# Patient Record
Sex: FEMALE
[De-identification: names, ages, dates, MRNs, and addresses within clinical notes are randomized per-mention and may not be internally consistent; named-entity substitution may affect disease eponyms.]

---

## 2018-01-27 ENCOUNTER — HOSPITAL ENCOUNTER (EMERGENCY)
Dept: HOSPITAL 88 - ER | Age: 63
Discharge: HOME | End: 2018-01-27
Payer: COMMERCIAL
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
## 2018-01-27 DIAGNOSIS — S00.83XA: Primary | ICD-10-CM

## 2018-01-27 DIAGNOSIS — M54.2: ICD-10-CM

## 2018-01-27 DIAGNOSIS — S16.1XXA: ICD-10-CM
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
## 2018-01-27 PROCEDURE — 99283 EMERGENCY DEPT VISIT LOW MDM: CPT

## 2018-01-27 PROCEDURE — 70450 CT HEAD/BRAIN W/O DYE: CPT

## 2018-01-27 PROCEDURE — 70486 CT MAXILLOFACIAL W/O DYE: CPT
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
